# Patient Record
Sex: FEMALE | Race: WHITE | NOT HISPANIC OR LATINO | Employment: PART TIME | URBAN - METROPOLITAN AREA
[De-identification: names, ages, dates, MRNs, and addresses within clinical notes are randomized per-mention and may not be internally consistent; named-entity substitution may affect disease eponyms.]

---

## 2019-07-10 ENCOUNTER — OFFICE VISIT (OUTPATIENT)
Dept: FAMILY MEDICINE | Facility: CLINIC | Age: 19
End: 2019-07-10
Payer: COMMERCIAL

## 2019-07-10 VITALS
HEIGHT: 61 IN | HEART RATE: 68 BPM | DIASTOLIC BLOOD PRESSURE: 70 MMHG | SYSTOLIC BLOOD PRESSURE: 118 MMHG | BODY MASS INDEX: 23.03 KG/M2 | OXYGEN SATURATION: 98 % | RESPIRATION RATE: 16 BRPM | WEIGHT: 122 LBS | TEMPERATURE: 99.3 F

## 2019-07-10 DIAGNOSIS — R51.9 NONINTRACTABLE HEADACHE, UNSPECIFIED CHRONICITY PATTERN, UNSPECIFIED HEADACHE TYPE: ICD-10-CM

## 2019-07-10 DIAGNOSIS — R59.1 LYMPHADENOPATHY: Primary | ICD-10-CM

## 2019-07-10 LAB
ANION GAP SERPL CALCULATED.3IONS-SCNC: 8 MMOL/L (ref 3–14)
BASOPHILS # BLD AUTO: 0 10E9/L (ref 0–0.2)
BASOPHILS NFR BLD AUTO: 0.4 %
BUN SERPL-MCNC: 9 MG/DL (ref 7–30)
CALCIUM SERPL-MCNC: 9.3 MG/DL (ref 8.5–10.1)
CHLORIDE SERPL-SCNC: 107 MMOL/L (ref 94–109)
CO2 SERPL-SCNC: 24 MMOL/L (ref 20–32)
CREAT SERPL-MCNC: 0.63 MG/DL (ref 0.52–1.04)
DIFFERENTIAL METHOD BLD: NORMAL
EOSINOPHIL # BLD AUTO: 0 10E9/L (ref 0–0.7)
EOSINOPHIL NFR BLD AUTO: 0.4 %
ERYTHROCYTE [DISTWIDTH] IN BLOOD BY AUTOMATED COUNT: 12.7 % (ref 10–15)
GFR SERPL CREATININE-BSD FRML MDRD: >90 ML/MIN/{1.73_M2}
GLUCOSE SERPL-MCNC: 78 MG/DL (ref 70–99)
HCT VFR BLD AUTO: 40.8 % (ref 35–47)
HGB BLD-MCNC: 13.7 G/DL (ref 11.7–15.7)
LYMPHOCYTES # BLD AUTO: 1.7 10E9/L (ref 0.8–5.3)
LYMPHOCYTES NFR BLD AUTO: 21.7 %
MCH RBC QN AUTO: 30.6 PG (ref 26.5–33)
MCHC RBC AUTO-ENTMCNC: 33.6 G/DL (ref 31.5–36.5)
MCV RBC AUTO: 91 FL (ref 78–100)
MONOCYTES # BLD AUTO: 0.6 10E9/L (ref 0–1.3)
MONOCYTES NFR BLD AUTO: 6.8 %
NEUTROPHILS # BLD AUTO: 5.7 10E9/L (ref 1.6–8.3)
NEUTROPHILS NFR BLD AUTO: 70.7 %
PLATELET # BLD AUTO: 198 10E9/L (ref 150–450)
POTASSIUM SERPL-SCNC: 3.4 MMOL/L (ref 3.4–5.3)
RBC # BLD AUTO: 4.47 10E12/L (ref 3.8–5.2)
SODIUM SERPL-SCNC: 139 MMOL/L (ref 133–144)
TSH SERPL DL<=0.005 MIU/L-ACNC: 1.8 MU/L (ref 0.4–4)
WBC # BLD AUTO: 8 10E9/L (ref 4–11)

## 2019-07-10 PROCEDURE — 85025 COMPLETE CBC W/AUTO DIFF WBC: CPT | Performed by: FAMILY MEDICINE

## 2019-07-10 PROCEDURE — 84443 ASSAY THYROID STIM HORMONE: CPT | Performed by: FAMILY MEDICINE

## 2019-07-10 PROCEDURE — 87389 HIV-1 AG W/HIV-1&-2 AB AG IA: CPT | Performed by: FAMILY MEDICINE

## 2019-07-10 PROCEDURE — 80048 BASIC METABOLIC PNL TOTAL CA: CPT | Performed by: FAMILY MEDICINE

## 2019-07-10 PROCEDURE — 36415 COLL VENOUS BLD VENIPUNCTURE: CPT | Performed by: FAMILY MEDICINE

## 2019-07-10 PROCEDURE — 99214 OFFICE O/P EST MOD 30 MIN: CPT | Performed by: FAMILY MEDICINE

## 2019-07-10 ASSESSMENT — MIFFLIN-ST. JEOR: SCORE: 1255.77

## 2019-07-10 NOTE — PATIENT INSTRUCTIONS
Get your contact prescription updated from your eye clinic.    Start doing some gentle range of motion neck exercises. Consider a mouth guard at night (check with your dentist) for clenching of your teeth.     Keep a headache diary and log what days you experience a headache and how they feel. Keep notes about what you did that day that might have contributed to it.    If you have an increase in headaches or they do not resolve come back in.     Schedule your physical exam.     Please call [facility] (formerly called [facility]) at [phone] to schedule your ultrasound.       At [facility], we strive to deliver an exceptional experience to you, every time we see you.  If you receive a survey in the mail, please send us back your thoughts. We really do value your feedback.    Your care team:     Family Medicine   ANNE-MARIE Diane MD Emily Bunt, APRN CNP S. MD Sonal Mckeon MD Angela Wermerskirchen, MD         Clinic hours: Monday - Wednesday 7 am-7 pm   Thursdays and Fridays 7 am-5 pm.     [facility]: Monday - Friday 11 am-9 pm,   Saturday and Sunday 9 am-5 pm.    [facility]: Monday -Thursday 8 am-8 pm; Friday 8 am-6 pm; Saturday and Sunday 9 am-5 pm.     [facility]: Monday - Thursday 8 am - 7 pm; Friday 8 am - 6 pm    Clinic: [phone]   [facility]: [phone]   [facility]: [phone]

## 2019-07-10 NOTE — LETTER
July 12, 2019      Poppy Martinez  [address]  [place] [address]        Dear Poppy,     It was a pleasure seeing you at your recent visit. Your labs have been reviewed and are attached.     The blood count panel, thyroid test and kidney and electrolyte panel was all normal.   The HIV screening test was negative.      Nothing was seen in your blood work of concern. Please continue with the plan we developed in the office.           Sincerely,        Candy Davenport MD      Results for orders placed or performed in visit on 07/10/19   HIV Screening   Result Value Ref Range    HIV Antigen Antibody Combo Nonreactive NR^Nonreactive       CBC with platelets differential   Result Value Ref Range    WBC 8.0 4.0 - 11.0 10e9/L    RBC Count 4.47 3.8 - 5.2 10e12/L    Hemoglobin 13.7 11.7 - 15.7 g/dL    Hematocrit 40.8 35.0 - 47.0 %    MCV 91 78 - 100 fl    MCH 30.6 26.5 - 33.0 pg    MCHC 33.6 31.5 - 36.5 g/dL    RDW 12.7 10.0 - 15.0 %    Platelet Count 198 150 - 450 10e9/L    % Neutrophils 70.7 %    % Lymphocytes 21.7 %    % Monocytes 6.8 %    % Eosinophils 0.4 %    % Basophils 0.4 %    Absolute Neutrophil 5.7 1.6 - 8.3 10e9/L    Absolute Lymphocytes 1.7 0.8 - 5.3 10e9/L    Absolute Monocytes 0.6 0.0 - 1.3 10e9/L    Absolute Eosinophils 0.0 0.0 - 0.7 10e9/L    Absolute Basophils 0.0 0.0 - 0.2 10e9/L    Diff Method Automated Method    Basic metabolic panel   Result Value Ref Range    Sodium 139 133 - 144 mmol/L    Potassium 3.4 3.4 - 5.3 mmol/L    Chloride 107 94 - 109 mmol/L    Carbon Dioxide 24 20 - 32 mmol/L    Anion Gap 8 3 - 14 mmol/L    Glucose 78 70 - 99 mg/dL    Urea Nitrogen 9 7 - 30 mg/dL    Creatinine 0.63 0.52 - 1.04 mg/dL    GFR Estimate >90 >60 mL/min/[1.73_m2]    GFR Estimate If Black >90 >60 mL/min/[1.73_m2]    Calcium 9.3 8.5 - 10.1 mg/dL   TSH with free T4 reflex   Result Value Ref Range    TSH 1.80 0.40 - 4.00 mU/L

## 2019-07-10 NOTE — PROGRESS NOTES
Subjective     Poppy Martinez is a 19 year old female who presents to clinic today for the following health issues: New Patient/Transfer of Care - has gone to  [facility] in the past per father.       HPI   Concern - Lump on neck   Onset: 3 months now     Description:   No pain; just swelling on left back of neck      Progression of Symptoms:  Grown in size     Accompanying Signs & Symptoms:  Complaint of HA     Therapies Tried and outcome: N/A  -Found a lump on her neck about 3 months ago and thought it was a pimple. It has not gone away. She has not experienced something like this before. She has not had a routine physical in the past few years, but has been overall healthy. She has regular periods.   -Patient has been experiencing headaches in the past month that is present when she wakes up and lasts all day. She describes it as a tension headache that starts from her temples and radiates down her neck, back, and into her eyes. She drinks about 6 32oz cups of water per day.  -She has tried taking Excedrin but experienced little relief. Patient wears contacts and says she sometimes feels a cloudy film in her right eye but is not sure if it is due to her contacts or if her eye is worsening.   -Patient has costral chondritis and experiences some chest pain that radiates down her arms, about once a week. When she has symptoms she occasionally has trouble breathing. She used to experience this daily so symptoms have improved.   -She reports having a lower appetite than normal for the past year. She lost some weight because of working too much and not eating enough but she is trying to eat more regularly.   -One year ago, patient started experiencing night sweats. She also clenches her teeth at night.  -She was tested for HIV recently at [facility] which came back negative. She does not think she has had any exposure since.   -Had a skin spot on her upper right arm that she has been keeping an eye on  "it. It has become slightly raised.  -She got a tattoo in January on the back of her neck,   -Denies any changes in hearing or taste, bowel movement, urination, fever, chills.       There is no problem list on file for this patient.    No past surgical history on file.    Social History     Tobacco Use     Smoking status: Never Smoker     Smokeless tobacco: Never Used   Substance Use Topics     Alcohol use: No     Family History   Problem Relation Age of Onset     Coronary Artery Disease Father 54     Lung Cancer Maternal Grandfather         smoker     Diabetes Paternal Grandmother      Diabetes Paternal Grandfather              Reviewed and updated as needed this visit by Provider         Review of Systems   ROS COMP: Constitutional, HEENT, cardiovascular, pulmonary, gi and gu systems are negative, except as otherwise noted.    This document serves as a record of the services and decisions personally performed by ANOOP MELLO. It was created on his/her behalf by Ksenia Sellers, a trained medical scribe. The creation of this document is based on the provider's statements to the medical scribe. Ksenia Sellers, July 10, 2019 12:34 PM      Objective    /70 (BP Location: Right arm, Patient Position: Sitting, Cuff Size: Adult Regular)   Pulse 68   Temp 99.3  F (37.4  C) (Oral)   Resp 16   Ht 1.549 m (5' 1\")   Wt 55.3 kg (122 lb)   LMP 06/06/2019 (Approximate)   SpO2 98%   BMI 23.05 kg/m    Body mass index is 23.05 kg/m .  Physical Exam   GENERAL: healthy, alert and no distress  EYES: Eyes grossly normal to inspection, PERRL and conjunctivae and sclerae normal  HENT: ear canals and TM's normal, nose and mouth without ulcers or lesions  NECK: less than 1 cm mass left posterior cervical chain neck, round, smooth, and rubbery, tattoo behind left ear, no asymmetry or scars, thyroid normal to palpation  RESP: lungs clear to auscultation - no rales, rhonchi or wheezes  CV: regular rate and rhythm, " normal S1 S2, no S3 or S4, no murmur, click or rub, no peripheral edema and peripheral pulses strong  ABDOMEN: soft, nontender, no hepatosplenomegaly, no masses and bowel sounds normal  MS: no gross musculoskeletal defects noted, no edema  SKIN: no suspicious lesions or rashes  PSYCH: mentation appears normal, affect normal/bright  NEURO: Normal strength and tone, mentation intact and speech normal. CN II-XII intact. Gain including heel to toe, tip toe and heel walking was all normal. Rhomberg was negative.             Assessment & Plan     1. Lymphadenopathy  Low suspicion for malignancy, but given persistence will evaluate.   - HIV Screening  - CBC with platelets differential  - Basic metabolic panel  - TSH with free T4 reflex  - US Head Neck Soft Tissue; Future    2. Nonintractable headache, unspecified chronicity pattern, unspecified headache type  Nl neuro exam. Reviewed headache diarrhea, mouth guard for clenching, hydration, sleep, neck tension reducing exercises. If headaches do not resolve with plan should f/u in clinic for further eval. Reviewed red flag symptoms that would precipitate the need for routine, urgent or emergent f/u          Patient Instructions     Get your contact prescription updated from your eye clinic.    Start doing some gentle range of motion neck exercises.     Keep a headache diary and log what days you experience a headache and how they feel. Keep notes about what you did that day that might have contributed to it.    If you have an increase in headaches come back in.     Schedule your physical exam.     Please call [facility] (formerly called [facility]) at [phone] to schedule your ultrasound.       At [facility], we strive to deliver an exceptional experience to you, every time we see you.  If you receive a survey in the mail, please send us back your thoughts. We really do value your feedback.    Your care  team:     Family Medicine   ANNE-MARIE Diane MD Emily Bunt, SUSAN [facility]   S. MD Sonal Mckeon MD Angela Wermerskirchen, MD         Clinic hours: Monday - Wednesday 7 am-7 pm   Thursdays and Fridays 7 am-5 pm.     [facility]: Monday - Friday 11 am-9 pm,   Saturday and Sunday 9 am-5 pm.    [facility]: Monday -Thursday 8 am-8 pm; Friday 8 am-6 pm; Saturday and Sunday 9 am-5 pm.     [facility]: Monday - Thursday 8 am - 7 pm; Friday 8 am - 6 pm    Clinic: [phone]   [facility]: [phone]   [facility]: [phone]                Return in about 3 months (around 10/10/2019) for Physical Exam.    Length of visit was 28 minutes with more than 50 percent of that time used for discussing medical concerns and education    The information in this document, created by the medical scribe for me, accurately reflects the services I personally performed and the decisions made by me. I have reviewed and approved this document for accuracy.   Candy Davenport MD  [facility]

## 2019-07-11 LAB — HIV 1+2 AB+HIV1 P24 AG SERPL QL IA: NONREACTIVE

## 2019-07-15 ENCOUNTER — ANCILLARY PROCEDURE (OUTPATIENT)
Dept: ULTRASOUND IMAGING | Facility: CLINIC | Age: 19
End: 2019-07-15
Attending: FAMILY MEDICINE
Payer: COMMERCIAL

## 2019-07-15 DIAGNOSIS — R59.1 LYMPHADENOPATHY: ICD-10-CM

## 2019-07-15 PROCEDURE — 76536 US EXAM OF HEAD AND NECK: CPT | Performed by: RADIOLOGY

## 2022-02-13 ENCOUNTER — OFFICE VISIT (OUTPATIENT)
Dept: URGENT CARE | Facility: URGENT CARE | Age: 22
End: 2022-02-13
Payer: COMMERCIAL

## 2022-02-13 VITALS
TEMPERATURE: 98.4 F | DIASTOLIC BLOOD PRESSURE: 62 MMHG | OXYGEN SATURATION: 96 % | HEART RATE: 77 BPM | WEIGHT: 104.25 LBS | SYSTOLIC BLOOD PRESSURE: 114 MMHG

## 2022-02-13 DIAGNOSIS — S61.012A LACERATION OF LEFT THUMB WITHOUT FOREIGN BODY WITHOUT DAMAGE TO NAIL, INITIAL ENCOUNTER: Primary | ICD-10-CM

## 2022-02-13 PROCEDURE — 90715 TDAP VACCINE 7 YRS/> IM: CPT | Performed by: PHYSICIAN ASSISTANT

## 2022-02-13 PROCEDURE — 12001 RPR S/N/AX/GEN/TRNK 2.5CM/<: CPT | Performed by: PHYSICIAN ASSISTANT

## 2022-02-13 PROCEDURE — 90471 IMMUNIZATION ADMIN: CPT | Performed by: PHYSICIAN ASSISTANT

## 2022-02-13 ASSESSMENT — ENCOUNTER SYMPTOMS
BACK PAIN: 0
MUSCULOSKELETAL NEGATIVE: 1
CHILLS: 0
VOMITING: 0
RESPIRATORY NEGATIVE: 1
LIGHT-HEADEDNESS: 0
DIZZINESS: 0
JOINT SWELLING: 0
ALLERGIC/IMMUNOLOGIC NEGATIVE: 1
FEVER: 0
ARTHRALGIAS: 0
PALPITATIONS: 0
NECK PAIN: 0
DIARRHEA: 0
CARDIOVASCULAR NEGATIVE: 1
NAUSEA: 0
EYES NEGATIVE: 1
SHORTNESS OF BREATH: 0
NECK STIFFNESS: 0
WEAKNESS: 0
WOUND: 1
HEADACHES: 0
SORE THROAT: 0
RHINORRHEA: 0
ENDOCRINE NEGATIVE: 1
COUGH: 0
MYALGIAS: 0

## 2022-02-13 NOTE — PROGRESS NOTES
Chief Complaint:     Chief Complaint   Patient presents with     Work Comp     Slicing lettuce at 8:30am this morning at work ([employer]) and lettuce was slipping so was trying to grab it and her hand slipped     Laceration     Left thumb laceration     Urgent Care          Assessment:     1. Laceration of left thumb without foreign body without damage to nail, initial encounter         Plan:    Imaging of the injured area for foreign body or fracture was not  Indicated.    Patient given tetanus booster in clinic today.    Wound closed per procedure note below.    Wound was cleaned with sterile saline and surgiscrub.  Antibiotic ointment and sterile dressing applied in clinic.  Patient given metal finger splint for protection.       Discussed home wound care. Return to  with increased swelling, pain, redness, pus or fevers.  Follow up for Suture removal in 7 days.       Patient was discharged in stable condition.  Patient verbalized understanding and agreed with this plan.      Procedure Note - Wound Repair:  Procedure performed by Harvey Negro.     Anesthesia was obtained with 1% plain lidocaine via Local.  The wound, located on the :L thumb, measured 2 cm and was clean wound edges, no foreign bodies.  The level of complexity was: simple.  The neurovascular exam was normal.  It was cleaned with surgiscrub, irrigated with normal saline and explored.  The wound was closed using 5-0 Ethylon interrupted.      SUBJECTIVE     HPI: Poppy Martinez is an 22 year old female that presents to clinic after cutting self on the L thumb with a knife at work this morning. She denies numbness of the finger. She denies dysfunction of the finger. Patient denies any loss of strength to the finger.  Patient is not up to date on tetanus.    Patient is new to [facility].     Review of Systems:  Review of Systems   Constitutional: Negative for chills and fever.   HENT: Negative for congestion, ear pain, rhinorrhea and sore  throat.    Eyes: Negative.    Respiratory: Negative.  Negative for cough and shortness of breath.    Cardiovascular: Negative.  Negative for chest pain and palpitations.   Gastrointestinal: Negative for diarrhea, nausea and vomiting.   Endocrine: Negative.    Genitourinary: Negative.    Musculoskeletal: Negative.  Negative for arthralgias, back pain, joint swelling, myalgias, neck pain and neck stiffness.   Skin: Positive for wound. Negative for rash.   Allergic/Immunologic: Negative.  Negative for immunocompromised state.   Neurological: Negative for dizziness, weakness, light-headedness and headaches.         Family History   No family history on file.     Problem history  There is no problem list on file for this patient.       Allergies  No Known Allergies     Social History  Social History     Socioeconomic History     Marital status: Single     Spouse name: Not on file     Number of children: Not on file     Years of education: Not on file     Highest education level: Not on file   Occupational History     Not on file   Tobacco Use     Smoking status: Current Every Day Smoker     Smokeless tobacco: Never Used     Tobacco comment: The patient Vapes every day   Substance and Sexual Activity     Alcohol use: Not on file     Drug use: Not on file     Sexual activity: Not on file   Other Topics Concern     Not on file   Social History Narrative     Not on file     Social Determinants of Health     Financial Resource Strain: Not on file   Food Insecurity: Not on file   Transportation Needs: Not on file   Physical Activity: Not on file   Stress: Not on file   Social Connections: Not on file   Intimate Partner Violence: Not on file   Housing Stability: Not on file        Current Meds  No current outpatient medications on file.     OBJECTIVE    Vitals reviewed by Ovidio Negro PA-C  /62 (BP Location: Left arm, Patient Position: Sitting, Cuff Size: Adult Regular)   Pulse 77   Temp 98.4  F (36.9  C) (Tympanic)    Wt 47.3 kg (104 lb 4 oz)   SpO2 96%   Breastfeeding No     Physical Exam:    Physical Exam  Vitals and nursing note reviewed.   Constitutional:       General: She is not in acute distress.     Appearance: She is well-developed. She is not ill-appearing, toxic-appearing or diaphoretic.   HENT:      Head: Normocephalic and atraumatic.      Right Ear: Tympanic membrane and external ear normal. No drainage, swelling or tenderness. Tympanic membrane is not perforated, erythematous, retracted or bulging.      Left Ear: Tympanic membrane and external ear normal. No drainage, swelling or tenderness. Tympanic membrane is not perforated, erythematous, retracted or bulging.      Nose: No mucosal edema, congestion or rhinorrhea.      Right Sinus: No maxillary sinus tenderness or frontal sinus tenderness.      Left Sinus: No maxillary sinus tenderness or frontal sinus tenderness.      Mouth/Throat:      Pharynx: No pharyngeal swelling, oropharyngeal exudate, posterior oropharyngeal erythema or uvula swelling.      Tonsils: No tonsillar abscesses.   Eyes:      Pupils: Pupils are equal, round, and reactive to light.   Neck:      Trachea: Trachea normal.   Cardiovascular:      Rate and Rhythm: Normal rate and regular rhythm.      Heart sounds: Normal heart sounds, S1 normal and S2 normal. No murmur heard.  No friction rub. No gallop.    Pulmonary:      Effort: Pulmonary effort is normal. No respiratory distress.      Breath sounds: Normal breath sounds. No decreased breath sounds, wheezing, rhonchi or rales.   Abdominal:      General: Bowel sounds are normal. There is no distension.      Palpations: Abdomen is soft. Abdomen is not rigid. There is no mass.      Tenderness: There is no abdominal tenderness. There is no guarding or rebound.   Musculoskeletal:      Left hand: Laceration present. No swelling, deformity, tenderness or bony tenderness. Normal range of motion. Normal strength. Normal sensation. There is no disruption  of two-point discrimination. Normal capillary refill. Normal pulse.        Hands:       Cervical back: Full passive range of motion without pain, normal range of motion and neck supple.      Comments: 2 cm laceration to the distal L thumb subcutaneus in nature with clean wound edges ad no contamination.   Lymphadenopathy:      Cervical: No cervical adenopathy.   Skin:     General: Skin is warm and dry.   Neurological:      Mental Status: She is alert and oriented to person, place, and time.      Cranial Nerves: No cranial nerve deficit.      Deep Tendon Reflexes: Reflexes are normal and symmetric.   Psychiatric:         Behavior: Behavior normal. Behavior is cooperative.         Thought Content: Thought content normal.         Judgment: Judgment normal.         Ovidio Negro PA-C 1:13 PM

## 2022-02-13 NOTE — PATIENT INSTRUCTIONS
Patient Education     Laceration of an Arm or Leg: Stitches, Staples, or Tape   A laceration is a cut through the skin. If it's deep or it's gaping open, it may require stitches or staples to close so it can heal. Minor cuts may be treated with surgical tape closures, or skin glue.   X-rays may be done if something may have entered the skin through the cut. You may also need a tetanus shot if you are not up to date on this vaccine.   Home care    Follow the healthcare provider s instructions on how to care for the cut.    Wash your hands with soap and clean, running water before and after caring for your wound. This is to help prevent infection.    Keep the wound clean and dry. If a bandage was applied and it becomes wet or dirty, replace it. Otherwise, leave it in place for the first 24 hours, then change it once a day or as directed.    If stitches or staples were used, clean the wound daily:  ? After removing the bandage, wash the area with soap and water. Use a wet cotton swab to loosen and remove any blood or crust that forms.  ? After cleaning, keep the wound clean and dry. Talk with your healthcare provider before putting any antibiotic ointment on the wound. Reapply the bandage.    Remove the bandage to shower as usual after the first 24 hours, but don't soak the area in water (no swimming) until the stitches or staples are removed.    If surgical tape closures were used, keep the area clean and dry. If it becomes wet, blot it dry with a towel. Let the surgical tape fall off on its own.    Follow the healthcare provider's instructions for any medicines prescribed.  ? The provider may prescribe an antibiotic cream or ointment to prevent infection. He or she may also prescribe an antibiotic pill. Don't stop taking this medicine until you have finished it all or the provider tells you to stop.  ? The provider may also prescribe medicine for pain. Follow the instructions exactly for how to take these  medicines.    Don't do activities that may reopen your wound.    Follow-up care  Follow up with your healthcare provider, or as advised. Most skin wounds heal within 10 days. But an infection may sometimes occur even with proper treatment. Check the wound daily for the signs of infection listed below. Stitches and staples should be removed within 7 to14 days. If surgical tape closures were used, you may remove them after 10 days if they have not fallen off by then.    When to seek medical advice  Call your healthcare provider right away if any of these occur:    Wound bleeding not controlled by direct pressure    Signs of infection, including increasing pain in the wound, increasing wound redness or swelling, or pus or bad odor coming from the wound    Chills, fever of 100.4 F (38 C) or higher, or as directed by your healthcare provider    Stitches or staples coming apart or falling out or surgical tape falling off before 7 days    Wound edges reopening    Color changes in the wound    Numbness around the wound     Decreased movement around the injured area  [employer] last reviewed this educational content on 6/1/2020 2000-2021 [employer]. All rights reserved. This information is not intended as a substitute for professional medical care. Always follow your healthcare professional's instructions.            today

## 2022-02-13 NOTE — NURSING NOTE
The patient had a to sign a form at [employer] when she was hired that states if she is injured on the job it is not the company's fault, it is her fault so no Work Comp is available per the patient's report.     Clinic Administered Medication Documentation          Injectable Medication Documentation    Patient was given Tdap. Prior to medication administration, verified patients identity using patient s name and date of birth. Please see MAR and medication order for additional information. Patient instructed to remain in clinic for 15 minutes and report any adverse reaction to staff immediately .      Was entire vial of medication used? Yes  Vial/Syringe: Syringe  Expiration Date:  07/15/2023  Was this medication supplied by the patient? No    Yaritza Santoro MA

## 2022-02-20 ENCOUNTER — OFFICE VISIT (OUTPATIENT)
Dept: URGENT CARE | Facility: URGENT CARE | Age: 22
End: 2022-02-20
Payer: COMMERCIAL

## 2022-02-20 VITALS
SYSTOLIC BLOOD PRESSURE: 114 MMHG | WEIGHT: 105 LBS | TEMPERATURE: 98.7 F | DIASTOLIC BLOOD PRESSURE: 78 MMHG | HEART RATE: 88 BPM | OXYGEN SATURATION: 98 %

## 2022-02-20 DIAGNOSIS — Z48.02 ENCOUNTER FOR REMOVAL OF SUTURES: Primary | ICD-10-CM

## 2022-02-20 PROCEDURE — 99207 PR NO BILLABLE SERVICE THIS VISIT: CPT | Performed by: EMERGENCY MEDICINE

## 2022-02-20 NOTE — PROGRESS NOTES
Medical Decision Making:  Pt presents for removal of sutures from the left thumb; laceration from 7 days ago. Wound was evaluated with no sign of infection, no erythema, no drainage, and no sign of dehiscence.  Wound otherwise appears to have healed well and sutures can be removed.  Upon removal wound appears well approximated with minimal to no bleeding and no dehiscence.  Advised to keep covered with a band aid for the next week and to protect from the sun as long as possible and to return with any worsening of the wound. Total of 5 sutures removed without complication.     Diagnosis:  (Z48.02) Encounter for removal of sutures  (primary encounter diagnosis)    Plan:  F/U with PMD in 1-2 days if any worsening of the wound and go to ED with worsening symptoms.    Subjective:  Chief Complaint   Patient presents with     Suture Removal     LEFT thumb   Patient with sutures placed 7 days ago to the left thumb.  She notes that there has been no increased pain, redness, fevers, red streaks going up the arm, numbness or weakness in the hand or other concerns.    Objective  Patient Vitals for the past 24 hrs:   BP Temp Temp src Pulse SpO2 Weight   02/20/22 0916 114/78 98.7  F (37.1  C) Tympanic 88 98 % 47.6 kg (105 lb)       Physical Exam   Constitutional: The patient is oriented to person, place, and time. Alert and   MSK: 1 cm linear laceration of the left thumb pad appears well-healed without signs of wound dehiscence.  No surrounding erythema, warmth, fluctuance or areas of pointing.  No red streaks going up the arm.  Distal CMS intact.

## 2022-02-20 NOTE — PATIENT INSTRUCTIONS
Patient Education     Suture Removal, Infected Wound  Your sutures are being removed. Your wound may become infected. The wound will not heal properly unless the infection is cleared. Infection in a wound may also spread if it is not treated. In most cases, antibiotic medicines are prescribed to treat a wound infection.  Symptoms of a wound infection include:    Redness or swelling around the wound    Warmth coming from the wound    New or worsening pain    Red streaks around the wound    Draining pus    Fever  Home care  Medicines    If you were given antibiotics, take them until they are gone or your healthcare provider tells you to stop. It is vital to finish the antibiotics even if you feel better. If you don't finish them, the infection may come back and be harder to treat.    Take medicine for pain as directed by your healthcare provider.  Wound care  Care for your wound as directed by the healthcare provider. This may include the following:    Apply a warm compress (clean cloth soaked in hot water) to the infected area for about 5 to 10 minutes at a time. Be very careful not to burn yourself. Test the cloth on a non-infected area to make sure it is not too hot.    Change the dressing daily. If it becomes wet, stained with wound fluid, or dirty, change it sooner. To change it:  ? Wash your hands with soap and water before changing the dressing.  ? Carefully remove the dressing and tape. If it sticks to the wound, you may need to wet it a little to remove it. (Don't do this if your healthcare provider has told you not to.)  ? Gently clean the wound with clean water (or saline) using gauze, a clean washcloth, or cotton swab.  ? Don't use soap, alcohol, peroxide or other cleansers.  ? If you were told to dry the wound before putting on a new dressing, gently pat. Don't rub.  ? Throw out the old dressing.  ? Wash your hands again before opening the new, clean dressing.  ? Wash your hands again when you are  done.  Follow-up care  Follow up with your healthcare provider as advised. It is important to keep your follow-up appointment to be certain that the infection is being treated. If a culture was done, you will be notified if the treatment needs to change. Call as directed for the results.  When to seek medical advice  Call your healthcare provider right away if any of these occur:    Symptoms of infection don't start to improve within 2 days of starting antibiotics.    Symptoms of infection get worse.    New symptoms, such as red streaks around the wound.    Fever of 100.4 F (38.0 C) or higher for more than 2 days after starting the antibiotics, or as advised by your healthcare provider  [employer] last reviewed this educational content on 3/1/2018    1261-3950 [employer]. All rights reserved. This information is not intended as a substitute for professional medical care. Always follow your healthcare professional's instructions.

## 2022-08-07 ENCOUNTER — ANCILLARY PROCEDURE (OUTPATIENT)
Dept: GENERAL RADIOLOGY | Facility: CLINIC | Age: 22
End: 2022-08-07
Attending: NURSE PRACTITIONER
Payer: COMMERCIAL

## 2022-08-07 ENCOUNTER — OFFICE VISIT (OUTPATIENT)
Dept: URGENT CARE | Facility: URGENT CARE | Age: 22
End: 2022-08-07
Payer: COMMERCIAL

## 2022-08-07 VITALS
OXYGEN SATURATION: 99 % | BODY MASS INDEX: 20.03 KG/M2 | HEART RATE: 89 BPM | SYSTOLIC BLOOD PRESSURE: 115 MMHG | DIASTOLIC BLOOD PRESSURE: 67 MMHG | TEMPERATURE: 98.7 F | RESPIRATION RATE: 20 BRPM | WEIGHT: 106 LBS

## 2022-08-07 DIAGNOSIS — S99.922A FOOT INJURY, LEFT, INITIAL ENCOUNTER: ICD-10-CM

## 2022-08-07 DIAGNOSIS — T14.8XXA CONTUSION OF BONE: Primary | ICD-10-CM

## 2022-08-07 PROCEDURE — 73630 X-RAY EXAM OF FOOT: CPT | Mod: TC | Performed by: RADIOLOGY

## 2022-08-07 PROCEDURE — 99213 OFFICE O/P EST LOW 20 MIN: CPT | Performed by: NURSE PRACTITIONER

## 2022-08-07 RX ORDER — LEVONORGESTREL/ETHIN.ESTRADIOL 0.1-0.02MG
1 TABLET ORAL DAILY
COMMUNITY

## 2022-08-07 NOTE — PROGRESS NOTES
Assessment & Plan     Contusion of bone    Foot injury, left, initial encounter    - XR Foot Left G/E 3 Views  - Ankle/Foot Bracing Supplies Order for DME - ONLY FOR DME     Xray not available at [facility], so patient completed at [facility] and called with results showing no obvious fracture or dislocation. Discussed likely bone bruise/contusion and recommended RICE: rest, ice, compress, elevate, tylenol and ibuprofen as needed. She is fitted with walking boot as needed per request.     Follow-up with PCP if symptoms persist for 4 days, and sooner if symptoms worsen or new symptoms develop.     Discussed red flag symptoms which warrant immediate visit in emergency room    All questions were answered and patient verbalized understanding.     Jeanine Chavez, DNP, APRN, CNP 8/7/2022 12:13 PM  [facility] URGENT [facility]          Sara Mcwilliams is a 22 year old female who presents to clinic today with her parents for the following health issues:  Chief Complaint   Patient presents with     Foot Pain     Left     MS Injury/Pain    Onset of symptoms was 1 day(s) ago.  Location: left foot  Context: The injury happened while getting stepped on with construction boot  Course of symptoms is worsening.    Severity severe  Current and Associated symptoms: Pain, Swelling, Bruising and Decreased range of motion  Denies  Warmth and Redness  Aggravating Factors: walking and weight-bearing  Therapies to improve symptoms include: none  This is the first time this type of problem has occurred for this patient.   She works at [email or web address] and would like a walking boot since her shoes do not fit.     Problem list, Medication list, Allergies, and Medical history reviewed in EPIC.    ROS:  Review of systems negative except for noted above        Objective    /67   Pulse 89   Temp 98.7  F (37.1  C) (Tympanic)   Resp 20   Wt 48.1 kg (106 lb)   SpO2 99%   BMI 20.03 kg/m    Physical Exam  Constitutional:        General: She is not in acute distress.     Appearance: She is not toxic-appearing or diaphoretic.   Cardiovascular:      Pulses: Normal pulses.   Musculoskeletal:      Left lower leg: Normal.      Left ankle: Normal.      Left foot: Decreased range of motion. Swelling and bony tenderness present.      Comments: Moderate swelling left foot with tenderness with palpation throughout left foot   Skin:     General: Skin is warm and dry.      Findings: Bruising present.      Comments: Bruising anterior left foot   Neurological:      Mental Status: She is alert.      Gait: Gait abnormal.      Comments: Walking with a limp favoring left foot          X-ray left foot was performed and reviewed independently by myself showing no obvious fracture or dislocation  Radiologist impression:   Results for orders placed or performed in visit on 08/07/22   XR Foot Left G/E 3 Views     Status: None    Narrative    EXAM: XR FOOT LEFT G/E 3 VIEWS  LOCATION: [facility]  DATE/TIME: 8/7/2022 11:05 AM    INDICATION: left foot pain throughout after getting stepped on  COMPARISON: None.      Impression    IMPRESSION: The left foot is negative for fracture.

## 2023-01-08 ENCOUNTER — OFFICE VISIT (OUTPATIENT)
Dept: URGENT CARE | Facility: URGENT CARE | Age: 23
End: 2023-01-08
Payer: COMMERCIAL

## 2023-01-08 ENCOUNTER — ANCILLARY PROCEDURE (OUTPATIENT)
Dept: GENERAL RADIOLOGY | Facility: CLINIC | Age: 23
End: 2023-01-08
Attending: PHYSICIAN ASSISTANT
Payer: COMMERCIAL

## 2023-01-08 VITALS
SYSTOLIC BLOOD PRESSURE: 126 MMHG | OXYGEN SATURATION: 98 % | DIASTOLIC BLOOD PRESSURE: 72 MMHG | WEIGHT: 118.5 LBS | HEART RATE: 73 BPM | BODY MASS INDEX: 22.39 KG/M2 | TEMPERATURE: 98.7 F

## 2023-01-08 DIAGNOSIS — S59.902A ELBOW INJURY, LEFT, INITIAL ENCOUNTER: ICD-10-CM
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
## 2023-01-08 DIAGNOSIS — M54.2 NECK PAIN: ICD-10-CM

## 2023-01-08 DIAGNOSIS — S49.92XA SHOULDER INJURY, LEFT, INITIAL ENCOUNTER: ICD-10-CM

## 2023-01-08 PROCEDURE — 72040 X-RAY EXAM NECK SPINE 2-3 VW: CPT | Mod: TC | Performed by: RADIOLOGY

## 2023-01-08 PROCEDURE — 99214 OFFICE O/P EST MOD 30 MIN: CPT | Performed by: PHYSICIAN ASSISTANT

## 2023-01-08 PROCEDURE — 73030 X-RAY EXAM OF SHOULDER: CPT | Mod: TC | Performed by: RADIOLOGY

## 2023-01-08 PROCEDURE — 73080 X-RAY EXAM OF ELBOW: CPT | Mod: TC | Performed by: RADIOLOGY

## 2023-01-08 RX ORDER — CYCLOBENZAPRINE HCL 5 MG
5 TABLET ORAL 2 TIMES DAILY PRN
Qty: 20 TABLET | Refills: 0 | Status: SHIPPED | OUTPATIENT
Start: 2023-01-08 | End: 2023-01-18

## 2023-01-08 RX ORDER — NAPROXEN 500 MG/1
500 TABLET ORAL 2 TIMES DAILY WITH MEALS
Qty: 20 TABLET | Refills: 0 | Status: SHIPPED | OUTPATIENT
Start: 2023-01-08

## 2023-01-08 NOTE — PROGRESS NOTES
Chief Complaint   Patient presents with     Shoulder Injury     Pt fell down stairs last night pt fell backwards unto left shoulder, swollen, painful to move. Pt has not taken anything for the pain.      Xrays- I see no elbow fracture, humerus fracture, shoulder fracture, shoulder dislocation or neck fracture.    Radiologist report:    Results for orders placed or performed in visit on 01/08/23   XR Cervical Spine 2/3 Views     Status: None    Narrative    EXAM: XR CERVICAL SPINE 2/3 VIEWS  LOCATION: [facility]  DATE/TIME: 1/8/2023 9:53 AM    INDICATION:  Fall, initial encounter, Shoulder injury, left, initial encounter, Elbow injury, left, initial encounter, Neck pain  COMPARISON: None.  TECHNIQUE: CR Cervical Spine.      Impression    IMPRESSION:     The odontoid process is intact. Vertebral body heights are unremarkable.    Mild reversal of the normal cervical lordosis centered at C5-C6. Mild C4-C5 and C5-C6 anterolisthesis.    Preserved intervertebral disc heights.    Prevertebral soft tissues are unremarkable.    The visualized lung apices are well aerated.         Results for orders placed or performed in visit on 01/08/23   XR Shoulder Left G/E 3 Views     Status: None    Narrative    EXAM: XR SHOULDER LEFT G/E 3 VIEWS  LOCATION: [facility]  DATE/TIME: 01/08/2023, 9:53 AM    INDICATION: Fall, initial encounter. Shoulder injury, left, initial encounter. Elbow injury, left, initial encounter. Neck pain.  COMPARISON: None.      Impression    IMPRESSION: Normal joint spaces and alignment. No fracture.        Results for orders placed or performed in visit on 01/08/23   XR Elbow Left G/E 3 Views     Status: None    Narrative    EXAM: XR ELBOW LEFT G/E 3 VIEWS  LOCATION: [facility]  DATE/TIME: 01/08/2023, 9:52 AM    INDICATION: Elbow pain. Fall.  COMPARISON: None.      Impression    IMPRESSION: Normal joint spaces and alignment.  No fracture or joint effusion.             ASSESSMENT:    ICD-10-CM    1. Fall, initial encounter  W19.XXXA XR Shoulder Left G/E 3 Views     XR Cervical Spine 2/3 Views     XR Elbow Left G/E 3 Views      2. Shoulder injury, left, initial encounter  S49.92XA XR Shoulder Left G/E 3 Views     XR Cervical Spine 2/3 Views     XR Elbow Left G/E 3 Views      3. Elbow injury, left, initial encounter  S59.902A XR Shoulder Left G/E 3 Views     XR Cervical Spine 2/3 Views     XR Elbow Left G/E 3 Views      4. Neck pain  M54.2 XR Shoulder Left G/E 3 Views     XR Cervical Spine 2/3 Views     XR Elbow Left G/E 3 Views              PLAN:   Advised about symptoms which might herald more serious problems.            Sammie Parnell PA-C      SUBJECTIVE:   Poppy Martinez is an 22 year old female who presents after falling backward down the steps yesterday. No LOSS OF CONSCIENCENESS. No bumps on her head. Mild neck discomfort. Significant Left shoulder pain, mild elbow pain.  She slipped on her sisters carpeted stairs at the top while walking down holding a cup of hot chocolate.  As she was falling backwards she tried to reach over and grab the left-sided railing but landed directly onto her left shoulder.  She then proceeded to bounce down the stairs, approximately 12-13 steps on the staircase total.    No Known Allergies    No past medical history on file.    levonorgestrel-ethinyl estradiol (AVIANE) 0.1-20 MG-MCG tablet, Take 1 tablet by mouth daily    No current facility-administered medications on file prior to visit.      Social History     Tobacco Use     Smoking status: Every Day     Smokeless tobacco: Never     Tobacco comments:     The patient Vapes every day   Substance Use Topics     Alcohol use: No     Drug use: No       ROS:  Gen: no fevers  Musculoskel: + as above  Skin: Neg for rash.    OBJECTIVE:  /72 (BP Location: Right arm, Patient Position: Sitting, Cuff Size: Adult Small)   Pulse 73   Temp 98.7  F  (37.1  C) (Tympanic)   Wt 53.8 kg (118 lb 8 oz)   SpO2 98%   BMI 22.39 kg/m     General:   awake, alert, and cooperative.  NAD.   Head: Normocephalic, atraumatic.  Eyes: Conjunctiva clear,   MS:  L shoulder, no shoulder deformity noted. Tender AC joint and subchondral junction. Also tender upper humerus. No elbow swelling, diffuse mild tenderness. FROM at elbow. Guarded, does not want to move shoulder joint.  Neck: Mild bilateral lower paracervical muscle tenderness. FROM with minimal discomfort.   cap refill intact, radial pulse intact  Neuro: Alert and oriented - normal speech.      Sammie Parnell PA-C

## 2023-01-11 NOTE — PROGRESS NOTES
ASSESSMENT & PLAN    Poppy was seen today for pain and pain.    Diagnoses and all orders for this visit:    Fall, initial encounter  -     Orthopedic  Referral  -     Physical Therapy Referral; Future    Shoulder injury, left, initial encounter  -     Orthopedic  Referral  -     Physical Therapy Referral; Future      This issue is acute and Unchanged.    We discussed these other possible diagnosis: Likely shoulder subluxations, reassuring cervical exam. Likely muscular component as well.    Plan:  - Today's Plan of Care:  Rest left shoulder, could consider sling for 1-2 weeks  Limit overhead motions or heavy lifting  [phone]    Discussed activity considerations and other supportive care including Ice/Heat, OTC and other topical medications as needed.    -We also discussed other future treatment options:  MRI if not improving    Follow Up: ~ 4 weeks    Concerning signs and symptoms were reviewed.  The patient expressed understanding of this management plan and all questions were answered at this time.    Tracy Azar MD [facility]  Sports Medicine Physician  [facility]      -----  Chief Complaint   Patient presents with     Left Shoulder - Pain     Left Elbow - Pain       SUBJECTIVE  Poppy Martinez is a/an 23 year old female who is seen as an  referral for evaluation of left shoulder injury.    The patient is seen by themselves.  The patient is Right handed    Onset: 5 day(s) ago, 1/7/2023. Patient describes injury as she fell down the stairs. She was holding her nieces toys so she was un able to catch herself. She fell back onto the her left shoulder and elbow.   Location of Pain: posterior shoulder radiates down to the elbow  Worsened by: closing her car door, changing her clothes, brushing her teeth, pulling, pushing  Better with: nothing   Treatments tried: ice and heat  Associated symptoms: feels like she's constantly  popping her shoulder back into the socket, burning pain with movement and shooting pain, some numbness into hand though this is positional    Orthopedic/Surgical history: NO  Social History/Occupation: works at [employer]     No family history pertinent to patient's problem today.     REVIEW OF SYSTEMS:  Review of Systems  Skin: no bruising, no swelling  Musculoskeletal: as above  Neurologic: no numbness, paresthesias  Remainder of review of systems is negative including constitutional, CV, pulmonary, GI, except as noted in HPI or medical history.    OBJECTIVE:  /74 (BP Location: Right arm)   Pulse 71   Wt 53.5 kg (118 lb)   BMI 22.30 kg/m     General: healthy, alert and in no distress  HEENT: no scleral icterus or conjunctival erythema  Skin: no suspicious lesions or rash. No jaundice.  CV: distal perfusion intact  Resp: normal respiratory effort without conversational dyspnea   Psych: normal mood and affect  Gait: NORMAL  Neuro: Normal light sensory exam of upper extremity    Cervical Spine Exam    Inspection:       No visible deformity        normal lordotic curvature maintained    Posture:      normal    Tender:      upper border of trapezius    Non-Tender:      remainder of cervical spine area    Range of Motion:       Full active and passive ROM forward flexion, extension, lateral rotation, lateral flexion.    Painful Motions:      none    Strength:     C4 (shoulder shrug)  symmetric 5/5       C5 (shoulder abduction) symmetric 5/5       C6 (elbow flexion) symmetric 5/5       C7 (elbow extension) symmetric 5/5       C8 (finger abduction, thumb flexion) symmetric 5/5    Sensation:     grossly intact througout bilateral upper extremities    Special Tests:      neg (-) Spurling    Skin:     well perfused       capillary refill brisk    Lymphatics:      no edema noted in the upper extremities     Bilateral Shoulder exam    Inspection and Posture:       normal    Skin:        no visible  deformities    Tender:        subacromial space left       posterior shoulder left    Non Tender:       remainder of shoulder bilateral    ROM:        forward flexion 160  left       abduction 160 left       internal rotation gluteal left       external rotation 35 left       asymmetric scapular motion    Painful motions:       flexion left       elevation left    Strength:        abduction 5/5 left       flexion 5/5 left       internal rotation 5/5 left       external rotation 5/5 left    Impingement testing:       neg (-) Neer left       positive (+) Lamas left    Sensation:        normal sensation over shoulder and upper extremity     RADIOLOGY:  I independently visualized and reviewed these images with the patient  Reviewed left shoulder x-rays from 1/8/2023 - no acute bony abnormality, no significant degenerative change    Reviewed left elbow x-ray and cervical spine x-rays from 1/8/2023 -  - no acute bony abnormality, no significant degenerative change    Reviewed UC note  Review of the result(s) of each unique test - XRs

## 2023-01-12 ENCOUNTER — OFFICE VISIT (OUTPATIENT)
Dept: ORTHOPEDICS | Facility: CLINIC | Age: 23
End: 2023-01-12
Payer: COMMERCIAL

## 2023-01-12 VITALS
WEIGHT: 118 LBS | BODY MASS INDEX: 22.3 KG/M2 | SYSTOLIC BLOOD PRESSURE: 116 MMHG | HEART RATE: 71 BPM | DIASTOLIC BLOOD PRESSURE: 74 MMHG
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
## 2023-01-12 DIAGNOSIS — S49.92XA SHOULDER INJURY, LEFT, INITIAL ENCOUNTER: ICD-10-CM

## 2023-01-12 PROCEDURE — 99204 OFFICE O/P NEW MOD 45 MIN: CPT | Performed by: PEDIATRICS

## 2023-01-12 NOTE — PATIENT INSTRUCTIONS
We discussed these other possible diagnosis: Likely shoulder subluxations, reassuring cervical exam. Likely muscular component as well.    Plan:  - Today's Plan of Care:  Rest left shoulder, could consider sling for 1-2 weeks  Limit overhead motions or heavy lifting  Rehab: [facility] - [phone]    Discussed activity considerations and other supportive care including Ice/Heat, OTC and other topical medications as needed.    -We also discussed other future treatment options:  MRI if not improving    Follow Up: ~ 4 weeks    If you have any further questions for your physician or physician s care team you can call [phone] and use option 3 to leave a voice message.

## 2023-01-12 NOTE — LETTER
1/12/2023         RE: Poppy Martinez  [address] [facility] [address]        Dear Colleague,    Thank you for referring your patient, Poppy Martinez, to the [facility]. Please see a copy of my visit note below.    ASSESSMENT & PLAN    Poppy was seen today for pain and pain.    Diagnoses and all orders for this visit:    Fall, initial encounter  -     Orthopedic  Referral  -     Physical Therapy Referral; Future    Shoulder injury, left, initial encounter  -     Orthopedic  Referral  -     Physical Therapy Referral; Future      This issue is acute and Unchanged.    We discussed these other possible diagnosis: Likely shoulder subluxations, reassuring cervical exam. Likely muscular component as well.    Plan:  - Today's Plan of Care:  Rest left shoulder, could consider sling for 1-2 weeks  Limit overhead motions or heavy lifting  [facility] [phone]    Discussed activity considerations and other supportive care including Ice/Heat, OTC and other topical medications as needed.    -We also discussed other future treatment options:  MRI if not improving    Follow Up: ~ 4 weeks    Concerning signs and symptoms were reviewed.  The patient expressed understanding of this management plan and all questions were answered at this time.    Tracy Azar MD [facility]  Sports Medicine Physician  [facility]      -----  Chief Complaint   Patient presents with     Left Shoulder - Pain     Left Elbow - Pain       SUBJECTIVE  Poppy Martinez is a/an 23 year old female who is seen as an  referral for evaluation of left shoulder injury.    The patient is seen by themselves.  The patient is Right handed    Onset: 5 day(s) ago, 1/7/2023. Patient describes injury as she fell down the stairs. She was holding her nieces toys so she was un able to catch herself. She fell back onto the her left  shoulder and elbow.   Location of Pain: posterior shoulder radiates down to the elbow  Worsened by: closing her car door, changing her clothes, brushing her teeth, pulling, pushing  Better with: nothing   Treatments tried: ice and heat  Associated symptoms: feels like she's constantly popping her shoulder back into the socket, burning pain with movement and shooting pain, some numbness into hand though this is positional    Orthopedic/Surgical history: NO  Social History/Occupation: works at [employer]     No family history pertinent to patient's problem today.     REVIEW OF SYSTEMS:  Review of Systems  Skin: no bruising, no swelling  Musculoskeletal: as above  Neurologic: no numbness, paresthesias  Remainder of review of systems is negative including constitutional, CV, pulmonary, GI, except as noted in HPI or medical history.    OBJECTIVE:  /74 (BP Location: Right arm)   Pulse 71   Wt 53.5 kg (118 lb)   BMI 22.30 kg/m     General: healthy, alert and in no distress  HEENT: no scleral icterus or conjunctival erythema  Skin: no suspicious lesions or rash. No jaundice.  CV: distal perfusion intact  Resp: normal respiratory effort without conversational dyspnea   Psych: normal mood and affect  Gait: NORMAL  Neuro: Normal light sensory exam of upper extremity    Cervical Spine Exam    Inspection:       No visible deformity        normal lordotic curvature maintained    Posture:      normal    Tender:      upper border of trapezius    Non-Tender:      remainder of cervical spine area    Range of Motion:       Full active and passive ROM forward flexion, extension, lateral rotation, lateral flexion.    Painful Motions:      none    Strength:     C4 (shoulder shrug)  symmetric 5/5       C5 (shoulder abduction) symmetric 5/5       C6 (elbow flexion) symmetric 5/5       C7 (elbow extension) symmetric 5/5       C8 (finger abduction, thumb flexion) symmetric 5/5    Sensation:     grossly intact througout bilateral  upper extremities    Special Tests:      neg (-) Spurling    Skin:     well perfused       capillary refill brisk    Lymphatics:      no edema noted in the upper extremities     Bilateral Shoulder exam    Inspection and Posture:       normal    Skin:        no visible deformities    Tender:        subacromial space left       posterior shoulder left    Non Tender:       remainder of shoulder bilateral    ROM:        forward flexion 160  left       abduction 160 left       internal rotation gluteal left       external rotation 35 left       asymmetric scapular motion    Painful motions:       flexion left       elevation left    Strength:        abduction 5/5 left       flexion 5/5 left       internal rotation 5/5 left       external rotation 5/5 left    Impingement testing:       neg (-) Neer left       positive (+) Lamas left    Sensation:        normal sensation over shoulder and upper extremity     RADIOLOGY:  I independently visualized and reviewed these images with the patient  Reviewed left shoulder x-rays from 1/8/2023 - no acute bony abnormality, no significant degenerative change    Reviewed left elbow x-ray and cervical spine x-rays from 1/8/2023 -  - no acute bony abnormality, no significant degenerative change    Reviewed UC note  Review of the result(s) of each unique test - XRs             Again, thank you for allowing me to participate in the care of your patient.        Sincerely,        Tracy Azar MD

## 2023-06-06 ENCOUNTER — OFFICE VISIT (OUTPATIENT)
Dept: FAMILY MEDICINE | Facility: CLINIC | Age: 23
End: 2023-06-06
Payer: COMMERCIAL

## 2023-06-06 VITALS
OXYGEN SATURATION: 98 % | DIASTOLIC BLOOD PRESSURE: 70 MMHG | SYSTOLIC BLOOD PRESSURE: 106 MMHG | HEIGHT: 62 IN | HEART RATE: 75 BPM | BODY MASS INDEX: 22.19 KG/M2 | WEIGHT: 120.6 LBS | RESPIRATION RATE: 18 BRPM | TEMPERATURE: 98.2 F

## 2023-06-06 DIAGNOSIS — L60.5 YELLOW NAILS: ICD-10-CM

## 2023-06-06 DIAGNOSIS — Z13.29 SCREENING FOR THYROID DISORDER: ICD-10-CM

## 2023-06-06 DIAGNOSIS — R21 RASH OF NECK: Primary | ICD-10-CM

## 2023-06-06 DIAGNOSIS — Z11.59 NEED FOR HEPATITIS C SCREENING TEST: ICD-10-CM

## 2023-06-06 DIAGNOSIS — R53.83 OTHER FATIGUE: ICD-10-CM

## 2023-06-06 LAB
BASOPHILS # BLD AUTO: 0.1 10E3/UL (ref 0–0.2)
BASOPHILS NFR BLD AUTO: 1 %
EOSINOPHIL # BLD AUTO: 0.2 10E3/UL (ref 0–0.7)
EOSINOPHIL NFR BLD AUTO: 3 %
ERYTHROCYTE [DISTWIDTH] IN BLOOD BY AUTOMATED COUNT: 12.5 % (ref 10–15)
HCT VFR BLD AUTO: 41.3 % (ref 35–47)
HGB BLD-MCNC: 13.6 G/DL (ref 11.7–15.7)
IMM GRANULOCYTES # BLD: 0 10E3/UL
IMM GRANULOCYTES NFR BLD: 0 %
LYMPHOCYTES # BLD AUTO: 2 10E3/UL (ref 0.8–5.3)
LYMPHOCYTES NFR BLD AUTO: 29 %
MCH RBC QN AUTO: 30.8 PG (ref 26.5–33)
MCHC RBC AUTO-ENTMCNC: 32.9 G/DL (ref 31.5–36.5)
MCV RBC AUTO: 94 FL (ref 78–100)
MONOCYTES # BLD AUTO: 0.4 10E3/UL (ref 0–1.3)
MONOCYTES NFR BLD AUTO: 6 %
NEUTROPHILS # BLD AUTO: 4.1 10E3/UL (ref 1.6–8.3)
NEUTROPHILS NFR BLD AUTO: 61 %
PLATELET # BLD AUTO: 236 10E3/UL (ref 150–450)
RBC # BLD AUTO: 4.41 10E6/UL (ref 3.8–5.2)
WBC # BLD AUTO: 6.7 10E3/UL (ref 4–11)

## 2023-06-06 PROCEDURE — 36415 COLL VENOUS BLD VENIPUNCTURE: CPT | Performed by: NURSE PRACTITIONER

## 2023-06-06 PROCEDURE — 86803 HEPATITIS C AB TEST: CPT | Performed by: NURSE PRACTITIONER

## 2023-06-06 PROCEDURE — 99214 OFFICE O/P EST MOD 30 MIN: CPT | Performed by: NURSE PRACTITIONER

## 2023-06-06 PROCEDURE — 80050 GENERAL HEALTH PANEL: CPT | Performed by: NURSE PRACTITIONER

## 2023-06-06 ASSESSMENT — ENCOUNTER SYMPTOMS
EYE ITCHING: 1
FATIGUE: 1
HEADACHES: 1

## 2023-06-06 ASSESSMENT — PAIN SCALES - GENERAL: PAINLEVEL: MODERATE PAIN (5)

## 2023-06-06 NOTE — PROGRESS NOTES
Assessment & Plan     Rash of neck  No signs of infection or fungus. Suspect dermatitis. OTC hydrocortisone 1% cream recommended for 1-2 weeks given location.     Other fatigue  Labs ordered to r/o possible causes of fatigue.     - Comprehensive metabolic panel (BMP + Alb, Alk Phos, ALT, AST, Total. Bili, TP)  - TSH with free T4 reflex  - CBC with platelets and differential    Yellow nails  Unclear etiology. Labs pending.     Screening for thyroid disorder  Screening.     - TSH with free T4 reflex    Need for hepatitis C screening test  Screening.     - Hepatitis C Screen Reflex to HCV RNA Quant and Genotype    Ordering of each unique test         Nicotine/Tobacco Cessation:  She reports that she has been smoking vaping device. She started smoking about a year ago. She has been exposed to tobacco smoke. She has never used smokeless tobacco.  Nicotine/Tobacco Cessation Plan:         Patient Instructions   Neck rash:  1. Try an over the counter hydrocortisone 1% cream twice a day for 1-2 weeks.     Labs today.       Desiree Morris [facility]    Sara Mcwilliams is a 23 year old, presenting for the following health issues:  Derm Problem (Ithy on tattoo area, wondering about having her thyroid checked )        6/6/2023     9:42 AM   Additional Questions   Roomed by Landy   Accompanied by none         6/6/2023     9:42 AM   Patient Reported Additional Medications   Patient reports taking the following new medications none     History of Present Illness       Reason for visit:  Thyriod  Symptom onset:  More than a month  Symptoms include:  [place] and swollen neck tired depressed yellow nails headaches sensitve gritty eyes  Symptom intensity:  Moderate  Symptom progression:  Worsening  Had these symptoms before:  No  What makes it worse:  No  What makes it better:  No    She eats 4 or more servings of fruits and vegetables daily.She consumes 2 sweetened beverage(s) daily.She exercises  "with enough effort to increase her heart rate 20 to 29 minutes per day.  She exercises with enough effort to increase her heart rate 3 or less days per week.   She is taking medications regularly.         Additional provider notes: Patient presents in clinic for thyroid concerns.     Rash on neck: right side of neck. This has been ongoing for 1 month. Is not raised or itchy. States it gets red and inflamed at times. She has tried benadryl lotion.     She is fasting today.     Thyroid concerns: her brother's godmother is an ER nurse and recommended she get her thryoid checked due to yellowing nails, fatigue, HA, sensitive skin, gritty eyes.     She is okay with screening Hep C.     TSH   Date Value Ref Range Status   07/10/2019 1.80 0.40 - 4.00 mU/L Final         No Known Allergies    Current Outpatient Medications   Medication     levonorgestrel-ethinyl estradiol (AVIANE) 0.1-20 MG-MCG tablet     naproxen (NAPROSYN) 500 MG tablet     No current facility-administered medications for this visit.       No past medical history on file.         Review of Systems   Constitutional: Positive for fatigue.   Eyes: Positive for itching (gritty).   Skin: Positive for rash (right sided).   Neurological: Positive for headaches.            Objective    /70 (BP Location: Right arm, Patient Position: Sitting, Cuff Size: Adult Regular)   Pulse 75   Temp 98.2  F (36.8  C) (Tympanic)   Resp 18   Ht 1.562 m (5' 1.5\")   Wt 54.7 kg (120 lb 9.6 oz)   LMP 05/17/2023 (Approximate)   SpO2 98%   Breastfeeding No   BMI 22.42 kg/m    Body mass index is 22.42 kg/m .  Physical Exam  Vitals reviewed.   Constitutional:       General: She is not in acute distress.     Appearance: Normal appearance. She is normal weight. She is not ill-appearing or toxic-appearing.   HENT:      Right Ear: Tympanic membrane, ear canal and external ear normal. There is no impacted cerumen.      Left Ear: Tympanic membrane, ear canal and external ear " normal. There is no impacted cerumen.      Nose: Nose normal.      Mouth/Throat:      Mouth: Mucous membranes are moist.      Pharynx: Oropharynx is clear. Posterior oropharyngeal erythema present.   Neck:     Cardiovascular:      Rate and Rhythm: Normal rate and regular rhythm.      Pulses: Normal pulses.      Heart sounds: Normal heart sounds.   Pulmonary:      Effort: Pulmonary effort is normal.      Breath sounds: Normal breath sounds.   Musculoskeletal:         General: Normal range of motion.      Cervical back: Normal range of motion and neck supple. No tenderness.   Lymphadenopathy:      Cervical: No cervical adenopathy.   Skin:     General: Skin is warm and dry.   Neurological:      Mental Status: She is alert and oriented to person, place, and time.   Psychiatric:         Behavior: Behavior normal.

## 2023-06-06 NOTE — PATIENT INSTRUCTIONS
Neck rash:  Try an over the counter hydrocortisone 1% cream twice a day for 1-2 weeks.     Labs today.

## 2023-06-07 LAB
ALBUMIN SERPL BCG-MCNC: 4.2 G/DL (ref 3.5–5.2)
ALP SERPL-CCNC: 44 U/L (ref 35–104)
ALT SERPL W P-5'-P-CCNC: 11 U/L (ref 10–35)
ANION GAP SERPL CALCULATED.3IONS-SCNC: 10 MMOL/L (ref 7–15)
AST SERPL W P-5'-P-CCNC: 18 U/L (ref 10–35)
BILIRUB SERPL-MCNC: 0.4 MG/DL
BUN SERPL-MCNC: 6.4 MG/DL (ref 6–20)
CALCIUM SERPL-MCNC: 9.5 MG/DL (ref 8.6–10)
CHLORIDE SERPL-SCNC: 108 MMOL/L (ref 98–107)
CREAT SERPL-MCNC: 0.72 MG/DL (ref 0.51–0.95)
DEPRECATED HCO3 PLAS-SCNC: 21 MMOL/L (ref 22–29)
GFR SERPL CREATININE-BSD FRML MDRD: >90 ML/MIN/1.73M2
GLUCOSE SERPL-MCNC: 93 MG/DL (ref 70–99)
HCV AB SERPL QL IA: NONREACTIVE
POTASSIUM SERPL-SCNC: 4.3 MMOL/L (ref 3.4–5.3)
PROT SERPL-MCNC: 7.3 G/DL (ref 6.4–8.3)
SODIUM SERPL-SCNC: 139 MMOL/L (ref 136–145)
TSH SERPL DL<=0.005 MIU/L-ACNC: 0.93 UIU/ML (ref 0.3–4.2)

## 2023-07-16 ENCOUNTER — HEALTH MAINTENANCE LETTER (OUTPATIENT)
Age: 23
End: 2023-07-16

## 2023-08-07 ENCOUNTER — OFFICE VISIT (OUTPATIENT)
Dept: URGENT CARE | Facility: URGENT CARE | Age: 23
End: 2023-08-07

## 2023-08-07 VITALS
TEMPERATURE: 99.3 F | DIASTOLIC BLOOD PRESSURE: 84 MMHG | HEART RATE: 88 BPM | WEIGHT: 126.2 LBS | BODY MASS INDEX: 23.46 KG/M2 | SYSTOLIC BLOOD PRESSURE: 137 MMHG | OXYGEN SATURATION: 97 %

## 2023-08-07 DIAGNOSIS — M25.512 CHRONIC LEFT SHOULDER PAIN: Primary | ICD-10-CM

## 2023-08-07 DIAGNOSIS — R09.81 NASAL CONGESTION: ICD-10-CM

## 2023-08-07 DIAGNOSIS — G89.29 CHRONIC LEFT SHOULDER PAIN: Primary | ICD-10-CM

## 2023-08-07 PROCEDURE — 99213 OFFICE O/P EST LOW 20 MIN: CPT | Performed by: FAMILY MEDICINE

## 2023-08-07 RX ORDER — FLUTICASONE PROPIONATE 50 MCG
2 SPRAY, SUSPENSION (ML) NASAL DAILY
Qty: 9.9 ML | Refills: 0 | COMMUNITY
Start: 2023-08-07

## 2023-08-07 NOTE — PROGRESS NOTES
Assessment:    ICD-10-CM    1. Chronic left shoulder pain  M25.512 Physical Therapy Referral    G89.29       2. Nasal congestion  R09.81 fluticasone (FLONASE) 50 MCG/ACT nasal spray          Carlo Sparks MD ....................  8/7/2023   1:30 PM            CC: possible growth left nostril    Patient presents with:  Urgent Care  Mass: Have a mass inside left nostril, noticed it just now, been having neck pain       HPI:  Poppy Martinez c/o 1 day of a growth in her left nostril (just noted today).  Has had on/off congestion on that side and does have h/o seasonal allergies.    Also having continued left shoulder blade pain since falling in January, would like to see PT      PMHx, current meds and allergies reviewed.    Current meds:  levonorgestrel-ethinyl estradiol  naproxen    O:    Vitals:    08/07/23 1308   BP: 137/84   BP Location: Left arm   Patient Position: Sitting   Cuff Size: Adult Regular   Pulse: 88   Temp: 99.3  F (37.4  C)   TempSrc: Tympanic   SpO2: 97%   Weight: 57.2 kg (126 lb 3.2 oz)     Gen:  A&O x 3, NAD  HEENT:  left inferior turbinate is boggy, right is normal, op clear    Lab  No results found for any visits on 08/07/23.  Diagnosis and treatment options were discussed.

## 2024-08-15 ENCOUNTER — OFFICE VISIT (OUTPATIENT)
Dept: URGENT CARE | Facility: URGENT CARE | Age: 24
End: 2024-08-15

## 2024-08-15 VITALS
SYSTOLIC BLOOD PRESSURE: 122 MMHG | DIASTOLIC BLOOD PRESSURE: 84 MMHG | RESPIRATION RATE: 16 BRPM | HEART RATE: 60 BPM | WEIGHT: 145.5 LBS | OXYGEN SATURATION: 96 % | TEMPERATURE: 97 F | BODY MASS INDEX: 27.05 KG/M2

## 2024-08-15 DIAGNOSIS — K11.21 ACUTE PAROTITIS: Primary | ICD-10-CM

## 2024-08-15 PROCEDURE — 99213 OFFICE O/P EST LOW 20 MIN: CPT

## 2024-08-15 RX ORDER — NAPROXEN 500 MG/1
500 TABLET ORAL 2 TIMES DAILY WITH MEALS
Qty: 30 TABLET | Refills: 0 | Status: SHIPPED | OUTPATIENT
Start: 2024-08-15

## 2024-08-15 ASSESSMENT — PAIN SCALES - GENERAL: PAINLEVEL: SEVERE PAIN (6)

## 2024-08-15 NOTE — PROGRESS NOTES
"Assessment & Plan   (K11.21) Acute parotitis  (primary encounter diagnosis)  Plan: naproxen (NAPROSYN) 500 MG tablet    Parotitis patient instructions discussed and provided.  Informed the patient to take naproxen as prescribed with food.  We discussed the need to drink plenty of fluids and suck on hard candy or lemon.  We also discussed the need to return to clinic with any new or worsening symptoms.  Patient acknowledged her understanding of the above plan.    The use of Dragon/[employer] dictation services may have been used to construct the content in this note; any grammatical or spelling errors are non-intentional. Please contact the author of this note directly if you are in need of any clarification.      SUSAN Mendez [facility] LUKE Mcwilliams is a 24 year old female who presents to clinic today for the following health issues:  Chief Complaint   Patient presents with    Jaw Pain     Right-sided jaw pain beginning Tuesday; pain currently 6/10 after 800mg Advil.      HPI  Patient reports having right-sided facial pain starting 2 days ago.  She rates the pain as 6/10 and has been taking ibuprofen for treatment.  She also used some \"tooth numbing cream\" that she said did not help. Patient denies any recent illness and states she drinks plenty of water during the day.     ROS:  Negative except noted above.    Review of Systems        Objective    /84 (BP Location: Left arm, Patient Position: Sitting, Cuff Size: Adult Regular)   Pulse 60   Temp 97  F (36.1  C) (Tympanic)   Resp 16   Wt 66 kg (145 lb 8 oz)   SpO2 96%   BMI 27.05 kg/m    Physical Exam   GENERAL: alert and no distress  EYES: Eyes grossly normal to inspection, PERRL and conjunctivae and sclerae normal  HENT: ear canals and TM's normal, nose and mouth without ulcers or lesions  NECK: no adenopathy, no asymmetry, masses, or scars  SKIN: Right sided facial swelling over the " parotid gland

## 2024-09-08 ENCOUNTER — HEALTH MAINTENANCE LETTER (OUTPATIENT)
Age: 24
End: 2024-09-08

## 2025-03-06 ENCOUNTER — PATIENT OUTREACH (OUTPATIENT)
Dept: FAMILY MEDICINE | Facility: CLINIC | Age: 25
End: 2025-03-06

## 2025-03-06 NOTE — TELEPHONE ENCOUNTER
Patient Quality Outreach    Patient is due for the following:   Physical Preventive Adult Physical      Topic Date Due    Flu Vaccine (1) 09/01/2024    COVID-19 Vaccine (3 - 2024-25 season) 09/01/2024       Action(s) Taken:   Schedule a nurse only visit for IMMUNIZATIONS    Type of outreach:    Sent [employer] message.    Questions for provider review:    None           Elizabeth Dasilva MA  Chart routed to Care Team.